# Patient Record
(demographics unavailable — no encounter records)

---

## 2025-03-18 NOTE — PHYSICAL EXAM
[Penis Abnormality] : normal circumcised penis [Chaperone Present] : A chaperone was present in the examining room during all aspects of the physical examination [de-identified] : right atrophic testicle: grade 3 varicocele on left; vas nornal [FreeTextEntry2] : Monserrat Cabrera MA

## 2025-03-18 NOTE — ASSESSMENT
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  Prior to his move that he was experiencing some urethral discomfort and.  He had STI testing in [place] which were all reported as normal.  He moved to [place] and again was having symptoms.  He underwent a second set of STI testing which was normal.  He was seen by Dr. Pinedo follow-up he who treated him with doxycycline.  The patient appears to be improved.  Of note is the fact that he ejaculated last evening.  He had a prolonged period of abstinence.  He now normally has intercourse or ejaculates 4-5 times per week.  During this period of stress and movement meant to [place] he did not have ejaculation for approximately 2 weeks.  He currently is taking doxycycline.  I instructed him to continue the course and complete this.  We discussed the need for safe sex practices.  We also discussed the role of regular ejaculation as well as stress management in terms of his symptoms.  The findings of a varicocele were discussed with the patient.  Discussed indications for varicocelectomy: 1, fertility, 2. ? developement of hypogonadism 3. Cosmesis 4. Pain  Plan: 1.  Complete course of antibiotics 2.  Continue sexual activity with safe sex practices and follow-up as indicated 3. semen analysis  4.  endocrine studies 5. ultrasound - confirmed BILATERAL VARICOCELES

## 2025-03-18 NOTE — HISTORY OF PRESENT ILLNESS
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 discharge "tingling" at tip of penis STI testing in [place] was negative then moved to [facility] seen at [facility] MD repeated testing all negative then seen by Dr Pastrana treated with doxycycline symptoms improved chlamydia  GC Mycoplasma trichomonas  All negative Last sexual intercourse/ejaculation yesterday Prior to that one- 2 weeks  [Urinary Incontinence] : no urinary incontinence [Urinary Retention] : no urinary retention [Urinary Urgency] : no urinary urgency [Urinary Frequency] : no urinary frequency [Nocturia] : no nocturia [Erectile Dysfunction] : no Erectile Dysfunction

## 2025-04-03 NOTE — HISTORY OF PRESENT ILLNESS
[Home] : at home, [unfilled] , at the time of the visit. [Medical Office: ([facility])___] : at the medical office located in  [Telehealth (audio & video)] : This visit was provided via telehealth using real-time 2-way audio visual technology. 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

## 2025-04-03 NOTE — ASSESSMENT
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  Prior to his move that he was experiencing some urethral discomfort and.  He had STI testing in [place] which were all reported as normal.  He moved to [place] and again was having symptoms.  He underwent a second set of STI testing which was normal.  He was seen by Dr. Pinedo follow-up he who treated him with doxycycline.  The patient appears to be improved.  Of note is the fact that he ejaculated last evening.  He had a prolonged period of abstinence.  He now normally has intercourse or ejaculates 4-5 times per week.  During this period of stress and movement meant to [place] he did not have ejaculation for approximately 2 weeks.  He currently is taking doxycycline.  I instructed him to continue the course and complete this.  We discussed the need for safe sex practices.  We also discussed the role of regular ejaculation as well as stress management in terms of his symptoms.  The findings of a varicocele were discussed with the patient.  Discussed indications for varicocelectomy: 1, fertility, 2. ? developement of hypogonadism 3. Cosmesis 4. Pain  Plan: 1.  Complete course of antibiotics 2.  Continue sexual activity with safe sex practices and follow-up as indicated 3. semen analysis  4.  endocrine studies 5. ultrasound - confirmed BILATERAL VARICOCELES  4.3.2025 returns by telemedicine T 514 LH 13 Estradiol 16 FSH 11 HIV and syphilis - negative semen analysis  volume  (patient notes significant spillage) volume 1.5 [2.5] TC 3.75 40%  morphology  Discussed high normal gonadotropins and potential significant  Discussed relationship of LH/T and FSH and spermatogenesis   discussed failure of  complete collection and potential for inaccurate  discussed poor predictive value of semen analysis  discussed potential for considering variocelectomy to improve semen analysis or prevent progession of  effects on patient The MONTY GARCIA  expressed fully understanding of the information provided, the consequences and the management. Fu post semen analysis to review

## 2025-05-01 NOTE — HISTORY OF PRESENT ILLNESS
[Home] : at home, [unfilled] , at the time of the visit. [Medical Office: ([facility])___] : at the medical office located in  [Telehealth (audio & video)] : This visit was provided via telehealth using real-time 2-way audio visual technology. 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

## 2025-05-01 NOTE — ASSESSMENT
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  Prior to his move that he was experiencing some urethral discomfort and.  He had STI testing in [place] which were all reported as normal.  He moved to [place] and again was having symptoms.  He underwent a second set of STI testing which was normal.  He was seen by Dr. Pinedo follow-up he who treated him with doxycycline.  The patient appears to be improved.  Of note is the fact that he ejaculated last evening.  He had a prolonged period of abstinence.  He now normally has intercourse or ejaculates 4-5 times per week.  During this period of stress and movement meant to [place] he did not have ejaculation for approximately 2 weeks.  He currently is taking doxycycline.  I instructed him to continue the course and complete this.  We discussed the need for safe sex practices.  We also discussed the role of regular ejaculation as well as stress management in terms of his symptoms.  The findings of a varicocele were discussed with the patient.  Discussed indications for varicocelectomy: 1, fertility, 2. ? development of hypogonadism 3. Cosmesis 4. Pain  Plan: 1.  Complete course of antibiotics 2.  Continue sexual activity with safe sex practices and follow-up as indicated 3. semen analysis  4.  endocrine studies 5. ultrasound - confirmed BILATERAL VARICOCELES   5.1.2025 endocrine studies  LH and FSH in high normal Testosterone normal semen analysis  volume 3.5 cc [3.85] tc 13.47 38% motility 1 % normal  The findings of a varicocele were discussed with the patient.  Discussed indications for varicocelectomy: 1, fertility, 2. ? developement of hypogonadism 3. Cosmesis 4. Pain  discussed potential progression discussed early vs late intervention The findings of a varicocele were discussed with  MONTY GARCIA. The pathophysiology was discussed. The surgical procedure of microsurgical varicocelectomy was fully discussed. Risks and complications were reviewed including but not limited to: 1. recurrence, 2. chronic testicular pain/discomfort, 3. hydrocele (temporary or permanent), 4. testicular injury or atrophy, 5 vas or nerve injury, 6. infection, 7. bleeding ,  8. general surgical and anesthetic risks etc. We discussed response rates with regards to semen parameters.  We discussed alternatives including veno-occlusive techniques and assisted reproduction including IUI and IVF. Postoperative course was described in detail. All questions of the patient were answered.